# Patient Record
Sex: MALE | Race: WHITE | NOT HISPANIC OR LATINO | Employment: OTHER | URBAN - METROPOLITAN AREA
[De-identification: names, ages, dates, MRNs, and addresses within clinical notes are randomized per-mention and may not be internally consistent; named-entity substitution may affect disease eponyms.]

---

## 2019-08-26 ENCOUNTER — OFFICE VISIT (OUTPATIENT)
Dept: FAMILY MEDICINE | Facility: CLINIC | Age: 44
End: 2019-08-26
Payer: COMMERCIAL

## 2019-08-26 VITALS
HEART RATE: 78 BPM | HEIGHT: 68 IN | WEIGHT: 193 LBS | BODY MASS INDEX: 29.25 KG/M2 | OXYGEN SATURATION: 98 % | TEMPERATURE: 98 F | SYSTOLIC BLOOD PRESSURE: 122 MMHG | DIASTOLIC BLOOD PRESSURE: 88 MMHG | RESPIRATION RATE: 18 BRPM

## 2019-08-26 DIAGNOSIS — R25.3 FASCICULATIONS: Primary | ICD-10-CM

## 2019-08-26 DIAGNOSIS — D22.9 CHANGE IN MOLE: ICD-10-CM

## 2019-08-26 PROCEDURE — 99214 OFFICE O/P EST MOD 30 MIN: CPT | Performed by: FAMILY MEDICINE

## 2019-08-26 ASSESSMENT — MIFFLIN-ST. JEOR: SCORE: 1754.81

## 2019-08-26 ASSESSMENT — PAIN SCALES - GENERAL: PAINLEVEL: NO PAIN (0)

## 2019-08-26 NOTE — PROGRESS NOTES
Subjective     Navin Velasquez is a 44 year old male who presents to clinic today for the following health issues:    Patient is here today for nerve pain which started a few months ago but within the last few weeks it has gotten worse. He states its not really a pain but more like tremors or something. He states it happens in his legs but he has not noticed it this week. He is also here today for a mole consult he states it has gotten bigger and had some discharge about a month ago it is located on his nose.     Fasciculations:  Buzzing/vibration sensation.  Leg tremors  Also felt femorous   With time felt like was in his stoach and one tme the chest ?   Started about 4 months; was worst in last couple of weeks    Waking up at night:   - usually 2-3 AM    - Feels like he is hooked to a tens unit    Headaches (long hx) has history concussions (suspected)  No alcohol uses  No medication  Diet: Was on road a lot, was eating snacks, cut down weight, not eating much vegetables.    Patient Active Problem List   Diagnosis     CARDIOVASCULAR SCREENING; LDL GOAL LESS THAN 160     Knee injury     Past Surgical History:   Procedure Laterality Date     ORTHOPEDIC SURGERY  8/2012    emergency right knee scope       Social History     Tobacco Use     Smoking status: Former Smoker     Smokeless tobacco: Former User   Substance Use Topics     Alcohol use: No     Family History   Problem Relation Age of Onset     Hypertension Mother      Vitiligo Mother      Cardiovascular Maternal Grandfather 34        mi     Cardiovascular Maternal Uncle 34     Hypertension Maternal Uncle      Esophageal Cancer Maternal Uncle      Hypertension Maternal Aunt      Prostate Cancer Paternal Uncle            Reviewed and updated as needed this visit by Provider    Review of Systems   Constitutional, HEENT, cardiovascular, pulmonary, gi and gu systems are negative, except as otherwise noted.      Objective    /88 (BP Location: Left arm, Cuff  "Size: Adult Regular)   Pulse 78   Temp 98  F (36.7  C) (Oral)   Resp 18   Ht 1.735 m (5' 8.31\")   Wt 87.5 kg (193 lb)   SpO2 98%   BMI 29.08 kg/m    Body mass index is 29.08 kg/m .  Physical Exam   GENERAL: anxious looking, alert and no distress  RESP: lungs clear to auscultation - no rales, rhonchi or wheezes  CV: regular rate and rhythm, normal S1 S2, no S3 or S4, no murmur, click or rub, no peripheral edema   MS: no gross musculoskeletal defects noted, no edema  NEURO: Normal strength and tone, mentation intact and speech normal, no obvious tremors or fasciculations.  PSYCH: mentation appears normal, affect normal/bright    Diagnostic Test Results:    Results for orders placed or performed in visit on 01/28/13   CBC with platelets   Result Value Ref Range    WBC 7.7 4.0 - 11.0 10e9/L    RBC Count 5.02 4.4 - 5.9 10e12/L    Hemoglobin 14.6 13.3 - 17.7 g/dL    Hematocrit 44.1 40.0 - 53.0 %    MCV 88 78 - 100 fl    MCH 29.2 26.5 - 33.0 pg    MCHC 33.2 31.5 - 36.5 g/dL    RDW 12.9 10.0 - 15.0 %    Platelet Count 148 (L) 150 - 450 10e9/L   Amylase   Result Value Ref Range    Amylase 98 30 - 110 U/L   Lipase   Result Value Ref Range    Lipase 59 20 - 250 U/L   Hepatic panel   Result Value Ref Range    Bilirubin Conjugated 0.0 0.0 - 0.3 mg/dL    Bilirubin Delta 0.4 0.0 - 0.4 mg/dL    Bilirubin Total 1.6 (H) 0.2 - 1.3 mg/dL    Albumin 4.7 3.9 - 5.1 g/dL    Protein Total 7.6 6.8 - 8.8 g/dL    Alkaline Phosphatase 70 40 - 150 U/L    ALT 76 (H) 0 - 70 U/L    AST 40 0 - 45 U/L       Assessment & Plan     Navin was seen today for nerve pain, mole consult and health maintenance.    Diagnoses and all orders for this visit:    Fasciculations  -     Cancel: Magnesium  -     Cancel: CBC with platelets differential  -     Cancel: Vitamin B12  -     CBC with platelets differential; Future  -     Comprehensive metabolic panel (BMP + Alb, Alk Phos, ALT, AST, Total. Bili, TP); Future  -     Lipid panel reflex to direct LDL " "Fasting; Future  -     Magnesium; Future  -     Vitamin B12; Future    Change in mole  -     DERMATOLOGY REFERRAL    BMI:   Estimated body mass index is 29.08 kg/m  as calculated from the following:    Height as of this encounter: 1.735 m (5' 8.31\").    Weight as of this encounter: 87.5 kg (193 lb).   Weight management plan: Discussed healthy diet and exercise guidelines    Return in about 1 week (around 9/2/2019).    Maxwell Weaver MD  [facility]    "

## 2019-08-29 DIAGNOSIS — R25.3 FASCICULATIONS: ICD-10-CM

## 2019-08-29 LAB
ALBUMIN SERPL-MCNC: 4.2 G/DL (ref 3.4–5)
ALP SERPL-CCNC: 62 U/L (ref 40–150)
ALT SERPL W P-5'-P-CCNC: 52 U/L (ref 0–70)
ANION GAP SERPL CALCULATED.3IONS-SCNC: 6 MMOL/L (ref 3–14)
AST SERPL W P-5'-P-CCNC: 24 U/L (ref 0–45)
BASOPHILS # BLD AUTO: 0 10E9/L (ref 0–0.2)
BASOPHILS NFR BLD AUTO: 0.2 %
BILIRUB SERPL-MCNC: 1.7 MG/DL (ref 0.2–1.3)
BUN SERPL-MCNC: 12 MG/DL (ref 7–30)
CALCIUM SERPL-MCNC: 9.1 MG/DL (ref 8.5–10.1)
CHLORIDE SERPL-SCNC: 109 MMOL/L (ref 94–109)
CHOLEST SERPL-MCNC: 153 MG/DL
CO2 SERPL-SCNC: 27 MMOL/L (ref 20–32)
CREAT SERPL-MCNC: 0.92 MG/DL (ref 0.66–1.25)
DIFFERENTIAL METHOD BLD: ABNORMAL
EOSINOPHIL # BLD AUTO: 0.1 10E9/L (ref 0–0.7)
EOSINOPHIL NFR BLD AUTO: 1.1 %
ERYTHROCYTE [DISTWIDTH] IN BLOOD BY AUTOMATED COUNT: 13.5 % (ref 10–15)
GFR SERPL CREATININE-BSD FRML MDRD: >90 ML/MIN/{1.73_M2}
GLUCOSE SERPL-MCNC: 88 MG/DL (ref 70–99)
HCT VFR BLD AUTO: 44.7 % (ref 40–53)
HDLC SERPL-MCNC: 47 MG/DL
HGB BLD-MCNC: 15.6 G/DL (ref 13.3–17.7)
LDLC SERPL CALC-MCNC: 93 MG/DL
LYMPHOCYTES # BLD AUTO: 2 10E9/L (ref 0.8–5.3)
LYMPHOCYTES NFR BLD AUTO: 35.1 %
MAGNESIUM SERPL-MCNC: 2.4 MG/DL (ref 1.6–2.3)
MCH RBC QN AUTO: 28.7 PG (ref 26.5–33)
MCHC RBC AUTO-ENTMCNC: 34.9 G/DL (ref 31.5–36.5)
MCV RBC AUTO: 82 FL (ref 78–100)
MONOCYTES # BLD AUTO: 0.5 10E9/L (ref 0–1.3)
MONOCYTES NFR BLD AUTO: 7.9 %
NEUTROPHILS # BLD AUTO: 3.2 10E9/L (ref 1.6–8.3)
NEUTROPHILS NFR BLD AUTO: 55.7 %
NONHDLC SERPL-MCNC: 106 MG/DL
PLATELET # BLD AUTO: 141 10E9/L (ref 150–450)
POTASSIUM SERPL-SCNC: 4.2 MMOL/L (ref 3.4–5.3)
PROT SERPL-MCNC: 7 G/DL (ref 6.8–8.8)
RBC # BLD AUTO: 5.43 10E12/L (ref 4.4–5.9)
SODIUM SERPL-SCNC: 142 MMOL/L (ref 133–144)
TRIGL SERPL-MCNC: 66 MG/DL
VIT B12 SERPL-MCNC: 1005 PG/ML (ref 193–986)
WBC # BLD AUTO: 5.7 10E9/L (ref 4–11)

## 2019-08-29 PROCEDURE — 80061 LIPID PANEL: CPT | Performed by: FAMILY MEDICINE

## 2019-08-29 PROCEDURE — 85025 COMPLETE CBC W/AUTO DIFF WBC: CPT | Performed by: FAMILY MEDICINE

## 2019-08-29 PROCEDURE — 36415 COLL VENOUS BLD VENIPUNCTURE: CPT | Performed by: FAMILY MEDICINE

## 2019-08-29 PROCEDURE — 80053 COMPREHEN METABOLIC PANEL: CPT | Performed by: FAMILY MEDICINE

## 2019-08-29 PROCEDURE — 82607 VITAMIN B-12: CPT | Performed by: FAMILY MEDICINE

## 2019-08-29 PROCEDURE — 83735 ASSAY OF MAGNESIUM: CPT | Performed by: FAMILY MEDICINE

## 2019-09-03 NOTE — PROGRESS NOTES
Chester Velasquez is a 44 year old male who presents to clinic today for the following health issues:    HPI   Chief Complaint   Patient presents with     Numbness     buzzing in lower left leg f/u     Still has the bilateral buzzing sensation both legs x for a long time but ghad kyler worse in last few months.   Worse when relaxing. Associated with tingling in the knee Lt  Was eating lots of Maced[place] nuts and found out can cause muscle issues in dogs and wonders if experiencing same. However symptoms are getting better    Patient Active Problem List   Diagnosis     CARDIOVASCULAR SCREENING; LDL GOAL LESS THAN 160     Knee injury     Past Surgical History:   Procedure Laterality Date     ORTHOPEDIC SURGERY  8/2012    emergency right knee scope       Social History     Tobacco Use     Smoking status: Former Smoker     Smokeless tobacco: Former User   Substance Use Topics     Alcohol use: No     Family History   Problem Relation Age of Onset     Hypertension Mother      Vitiligo Mother      Cardiovascular Maternal Grandfather 34        mi     Cardiovascular Maternal Uncle 34     Hypertension Maternal Uncle      Esophageal Cancer Maternal Uncle      Hypertension Maternal Aunt      Prostate Cancer Paternal Uncle          PROBLEMS TO ADD ON...  Reviewed and updated as needed this visit by Provider    Review of Systems    HEENT, cardiovascular, pulmonary, gi and gu systems are negative, except as otherwise noted.      Objective    /78   Pulse 56   Temp 97.5  F (36.4  C) (Oral)   Wt 88.5 kg (195 lb)   SpO2 96%   BMI 29.38 kg/m    Body mass index is 29.38 kg/m .  Physical Exam   GENERAL: healthy, alert and no distress  NECK: no adenopathy and thyroid normal to palpation  RESP: lungs clear to auscultation - no rales, rhonchi or wheezes  CV: regular rate and rhythm, normal S1 S2, no S3 or S4, no murmur, click or rub  MS: no gross musculoskeletal defects noted, no edema  NEURO: Normal strength and tone,  DTR normal, mentation intact and speech normal    Diagnostic Test Results:  Labs reviewed in Epic        Assessment & Plan     Navin was seen today for numbness.    Diagnoses and all orders for this visit:    Abnormal sensation of lower extremity    Non specific symptoms; could be consequence of vibrations from daily driving. He wants to get to root cause; will have evaluation by neurology  -     NEUROLOGY ADULT REFERRAL    Return in about 1 year (around 9/4/2020) for Physical Exam.    Maxwell Weaver MD  [facility]

## 2019-09-04 ENCOUNTER — OFFICE VISIT (OUTPATIENT)
Dept: FAMILY MEDICINE | Facility: CLINIC | Age: 44
End: 2019-09-04
Payer: COMMERCIAL

## 2019-09-04 VITALS
BODY MASS INDEX: 29.38 KG/M2 | HEART RATE: 56 BPM | SYSTOLIC BLOOD PRESSURE: 122 MMHG | OXYGEN SATURATION: 96 % | TEMPERATURE: 97.5 F | WEIGHT: 195 LBS | DIASTOLIC BLOOD PRESSURE: 78 MMHG

## 2019-09-04 DIAGNOSIS — R20.9 ABNORMAL SENSATION OF LOWER EXTREMITY: Primary | ICD-10-CM

## 2019-09-04 PROCEDURE — 99213 OFFICE O/P EST LOW 20 MIN: CPT | Performed by: FAMILY MEDICINE

## 2019-10-30 ENCOUNTER — OFFICE VISIT (OUTPATIENT)
Dept: NEUROLOGY | Facility: CLINIC | Age: 44
End: 2019-10-30
Attending: FAMILY MEDICINE
Payer: COMMERCIAL

## 2019-10-30 VITALS
HEIGHT: 69 IN | WEIGHT: 200.8 LBS | BODY MASS INDEX: 29.74 KG/M2 | SYSTOLIC BLOOD PRESSURE: 143 MMHG | HEART RATE: 71 BPM | DIASTOLIC BLOOD PRESSURE: 76 MMHG | OXYGEN SATURATION: 96 %

## 2019-10-30 DIAGNOSIS — R20.9 SENSORY DISTURBANCE: ICD-10-CM

## 2019-10-30 DIAGNOSIS — H21.562 AFFERENT PUPILLARY DEFECT OF LEFT EYE: ICD-10-CM

## 2019-10-30 DIAGNOSIS — R29.818 LHERMITTE'S SIGN POSITIVE: ICD-10-CM

## 2019-10-30 PROCEDURE — 99203 OFFICE O/P NEW LOW 30 MIN: CPT | Performed by: PSYCHIATRY & NEUROLOGY

## 2019-10-30 RX ORDER — SENNOSIDES 8.6 MG
1300 CAPSULE ORAL EVERY 8 HOURS PRN
COMMUNITY

## 2019-10-30 ASSESSMENT — MIFFLIN-ST. JEOR: SCORE: 1801.2

## 2019-10-30 ASSESSMENT — PAIN SCALES - GENERAL: PAINLEVEL: NO PAIN (0)

## 2019-10-30 NOTE — LETTER
10/30/2019       RE: Navin Velasquez  [address] [place] [address]     Dear Colleague,    Thank you for referring your patient, Navin Velasquez, to the [facility] at [facility]. Please see a copy of my visit note below.    Visit Date:   10/30/2019      2019         Darshan Carrasquillo MD   [address] [place] [address]  [id number]      Re:   Navin Velasquez    1975   [facility]  [id number]      Dear Dr. Carrasquillo:      I saw your patient, Navin Velasquez for neurologic evaluation on 10/30/2019.  He is a 44-year-old male here for evaluation of lower extremity sensory symptoms.      He does local .  He has found for some time at the end of the day he would have what he characterizes as muscle spasms in the legs.      About 7 or 8 months ago he began experiencing a different sensation.  It is a pulsating sense in his legs that is not visible.  It is present in any position, but bothers him most when he is in bed.  He describes it as the internal aspect of the left leg extending down to the foot and the lateral aspect of the right calf.  Occasionally, he has had the same shaky sensation in his abdomen.  He was eating macadamia nuts daily and he stopped those and this shaky feeling seemed to improve.      He has not appreciated any weakness or loss of sensation.  He does report some intermittent urinary urgency.      He does report a history of herniated disk in his back and he would get some intermittent foot and left leg numbness with ambulation that he feels related to this.  These symptoms resolved.  He did not have surgery.      He denies any upper extremity symptoms.      He initially denied any visual symptoms, but when questioned further, he does indicate in  he developed pain and blurring of vision in the left eye.  He had been using some spray paint that day.  However, the  visual blurring persisted for several days.  He recalls seeing an eye doctor at [facility] and, by his description, they found floaters but no one mentioned optic neuritis.      His wife is a nurse practitioner.  She discussed his current symptoms with colleagues and suggested he flex his neck and see if it would produce his symptoms in his legs and it did.  He does have some intermittent neck pain but nothing persistent.      Recent laboratory work included a CBC which was essentially normal aside from a platelet count of 141,000.  Comprehensive metabolic panel was essentially normal as well.  B12 level 1005.      His past history is otherwise unremarkable.  He does not take any prescription medications, but will use Tylenol or ibuprofen for pain.      ALLERGIES:  He has no medical allergies.      FAMILY HISTORY:  Notable for paternal grandmother with Alzheimer's disease, but otherwise is negative for neurologic disease as best he knows.      SOCIAL HISTORY:  He is a .  He does not smoke or use alcohol.      PHYSICAL EXAMINATION:   GENERAL:  Reveals the patient is alert and cooperative.   VITAL SIGNS:  Heart rate 71.  Blood pressure 143/76.   CRANIAL NERVES:  The left pupil is slightly larger than the right, and he appears to have a subtle afferent pupillary defect on the left.  I do not appreciate any optic atrophy, however.  He can get down to 20/20 vision in both eyes with reading glasses.  Extraocular motility is full with some end-gaze horizontal nystagmus.  Otherwise, aside from a slight flattening of the left nasolabial fold, cranial nerves II-XII are intact.   MOTOR:  Reveals excellent upper and lower extremity strength.   SENSORY:  Examination is intact to position, vibration and pinprick.  There is no sensory loss over the trunk.   GAIT, STATION AND COORDINATION:  Finger-to-nose and heel-to-shin are done well.  His muscle tone is normal.   REFLEXES:  2+ in upper  extremities, 2+ at the knees and 2-3+ at the ankles.  Plantar responses are flexor.      IMPRESSION:   1. Sensory disturbance.   2. Patient report of positive Lhermitte sign.   3. Afferent pupillary defect, left eye.      PLAN:  I did explain to the patient that his examination is largely unrevealing aside from the eye findings.  The symptoms he develops when he flexes his neck suggests a possibility of a cervical spinal cord lesion.  The possible afferent pupillary defect on the left suggests the possibility of multifocal neurologic process such as demyelinating disease.      For further evaluation, he is going to have head, cervical and thoracic MRI scans done.  I will communicate these results to him when available.  Additional testing may prove necessary depending on the studies.      Sincerely,            GIANFRANCO HUFF MD             D: 10/30/2019   T: 10/30/2019   MT: SHERRY      Name:     LALITO PAULINO   MRN:      [id number]        Account:      [id number]   :      1975           Visit Date:   10/30/2019      Document: [id number]       cc: Darshan Carrasquillo MD       Again, thank you for allowing me to participate in the care of your patient.      Sincerely,    Gianfranco Huff MD

## 2019-10-30 NOTE — LETTER
10/30/2019         RE: Navin Velasquez  [address] [place] [address]        Dear Colleague,    Thank you for referring your patient, Navin Velasquez, to the [facility]. Please see a copy of my visit note below.    Visit Date:   10/30/2019      2019         Darshan Carrasquillo MD   [facility]   [address]   [place]  [address]      Re:   Navin Velasquez    1975   [facility]  [id number]      Dear Dr. Carrasquillo:      I saw your patient, Navin Velasquez for neurologic evaluation on 10/30/2019.  He is a 44-year-old male here for evaluation of lower extremity sensory symptoms.      He does local .  He has found for some time at the end of the day he would have what he characterizes as muscle spasms in the legs.      About 7 or 8 months ago he began experiencing a different sensation.  It is a pulsating sense in his legs that is not visible.  It is present in any position, but bothers him most when he is in bed.  He describes it as the internal aspect of the left leg extending down to the foot and the lateral aspect of the right calf.  Occasionally, he has had the same shaky sensation in his abdomen.  He was eating macadamia nuts daily and he stopped those and this shaky feeling seemed to improve.      He has not appreciated any weakness or loss of sensation.  He does report some intermittent urinary urgency.      He does report a history of herniated disk in his back and he would get some intermittent foot and left leg numbness with ambulation that he feels related to this.  These symptoms resolved.  He did not have surgery.      He denies any upper extremity symptoms.      He initially denied any visual symptoms, but when questioned further, he does indicate in  he developed pain and blurring of vision in the left eye.  He had been using some spray paint that day.  However, the visual blurring persisted for several days.  He  recalls seeing an eye doctor at [facility] and, by his description, they found floaters but no one mentioned optic neuritis.      His wife is a nurse practitioner.  She discussed his current symptoms with colleagues and suggested he flex his neck and see if it would produce his symptoms in his legs and it did.  He does have some intermittent neck pain but nothing persistent.      Recent laboratory work included a CBC which was essentially normal aside from a platelet count of 141,000.  Comprehensive metabolic panel was essentially normal as well.  B12 level 1005.      His past history is otherwise unremarkable.  He does not take any prescription medications, but will use Tylenol or ibuprofen for pain.      ALLERGIES:  He has no medical allergies.      FAMILY HISTORY:  Notable for paternal grandmother with Alzheimer's disease, but otherwise is negative for neurologic disease as best he knows.      SOCIAL HISTORY:  He is a .  He does not smoke or use alcohol.      PHYSICAL EXAMINATION:   GENERAL:  Reveals the patient is alert and cooperative.   VITAL SIGNS:  Heart rate 71.  Blood pressure 143/76.   CRANIAL NERVES:  The left pupil is slightly larger than the right, and he appears to have a subtle afferent pupillary defect on the left.  I do not appreciate any optic atrophy, however.  He can get down to 20/20 vision in both eyes with reading glasses.  Extraocular motility is full with some end-gaze horizontal nystagmus.  Otherwise, aside from a slight flattening of the left nasolabial fold, cranial nerves II-XII are intact.   MOTOR:  Reveals excellent upper and lower extremity strength.   SENSORY:  Examination is intact to position, vibration and pinprick.  There is no sensory loss over the trunk.   GAIT, STATION AND COORDINATION:  Finger-to-nose and heel-to-shin are done well.  His muscle tone is normal.   REFLEXES:  2+ in upper extremities, 2+ at the knees and 2-3+ at the ankles.   Plantar responses are flexor.      IMPRESSION:   1. Sensory disturbance.   2. Patient report of positive Lhermitte sign.   3. Afferent pupillary defect, left eye.      PLAN:  I did explain to the patient that his examination is largely unrevealing aside from the eye findings.  The symptoms he develops when he flexes his neck suggests a possibility of a cervical spinal cord lesion.  The possible afferent pupillary defect on the left suggests the possibility of multifocal neurologic process such as demyelinating disease.      For further evaluation, he is going to have head, cervical and thoracic MRI scans done.  I will communicate these results to him when available.  Additional testing may prove necessary depending on the studies.      Sincerely,            GIANFRANCO HUFF MD             D: 10/30/2019   T: 10/30/2019   MT: SHERRY      Name:     LALITO PAULINO   MRN:      [id number]        Account:      [id number]   :      1975           Visit Date:   10/30/2019      Document: [id number]       cc: Darshan Carrasquillo MD       Again, thank you for allowing me to participate in the care of your patient.        Sincerely,        Gianfranco Huff MD

## 2019-10-30 NOTE — NURSING NOTE
"Navin Ron's goals for this visit include: return  He requests these members of his care team be copied on today's visit information:     PCP: José Manuel Kulkarni    Referring Provider:  Maxwell Weaver MD  [address]  [place] [address]    BP (!) 143/76   Pulse 71   Ht 1.753 m (5' 9\")   Wt 91.1 kg (200 lb 12.8 oz)   SpO2 96%   BMI 29.65 kg/m      Do you need any medication refills at today's visit? n  "

## 2019-10-31 NOTE — PROGRESS NOTES
Visit Date:   10/30/2019      2019         Darshan Carrasquillo MD   [facility]   [address]   [place]  [address]      Re:   Navin Velasquez    1975   MRN  [id number]      Dear Dr. Carrasquillo:      I saw your patient, Navin Velasquez for neurologic evaluation on 10/30/2019.  He is a 44-year-old male here for evaluation of lower extremity sensory symptoms.      He does local .  He has found for some time at the end of the day he would have what he characterizes as muscle spasms in the legs.      About 7 or 8 months ago he began experiencing a different sensation.  It is a pulsating sense in his legs that is not visible.  It is present in any position, but bothers him most when he is in bed.  He describes it as the internal aspect of the left leg extending down to the foot and the lateral aspect of the right calf.  Occasionally, he has had the same shaky sensation in his abdomen.  He was eating macadamia nuts daily and he stopped those and this shaky feeling seemed to improve.      He has not appreciated any weakness or loss of sensation.  He does report some intermittent urinary urgency.      He does report a history of herniated disk in his back and he would get some intermittent foot and left leg numbness with ambulation that he feels related to this.  These symptoms resolved.  He did not have surgery.      He denies any upper extremity symptoms.      He initially denied any visual symptoms, but when questioned further, he does indicate in  he developed pain and blurring of vision in the left eye.  He had been using some spray paint that day.  However, the visual blurring persisted for several days.  He recalls seeing an eye doctor at [facility] and, by his description, they found floaters but no one mentioned optic neuritis.      His wife is a nurse practitioner.  She discussed his current symptoms with colleagues and suggested  he flex his neck and see if it would produce his symptoms in his legs and it did.  He does have some intermittent neck pain but nothing persistent.      Recent laboratory work included a CBC which was essentially normal aside from a platelet count of 141,000.  Comprehensive metabolic panel was essentially normal as well.  B12 level 1005.      His past history is otherwise unremarkable.  He does not take any prescription medications, but will use Tylenol or ibuprofen for pain.      ALLERGIES:  He has no medical allergies.      FAMILY HISTORY:  Notable for paternal grandmother with Alzheimer's disease, but otherwise is negative for neurologic disease as best he knows.      SOCIAL HISTORY:  He is a .  He does not smoke or use alcohol.      PHYSICAL EXAMINATION:   GENERAL:  Reveals the patient is alert and cooperative.   VITAL SIGNS:  Heart rate 71.  Blood pressure 143/76.   CRANIAL NERVES:  The left pupil is slightly larger than the right, and he appears to have a subtle afferent pupillary defect on the left.  I do not appreciate any optic atrophy, however.  He can get down to 20/20 vision in both eyes with reading glasses.  Extraocular motility is full with some end-gaze horizontal nystagmus.  Otherwise, aside from a slight flattening of the left nasolabial fold, cranial nerves II-XII are intact.   MOTOR:  Reveals excellent upper and lower extremity strength.   SENSORY:  Examination is intact to position, vibration and pinprick.  There is no sensory loss over the trunk.   GAIT, STATION AND COORDINATION:  Finger-to-nose and heel-to-shin are done well.  His muscle tone is normal.   REFLEXES:  2+ in upper extremities, 2+ at the knees and 2-3+ at the ankles.  Plantar responses are flexor.      IMPRESSION:   1. Sensory disturbance.   2. Patient report of positive Lhermitte sign.   3. Afferent pupillary defect, left eye.      PLAN:  I did explain to the patient that his examination is largely unrevealing aside  from the eye findings.  The symptoms he develops when he flexes his neck suggests a possibility of a cervical spinal cord lesion.  The possible afferent pupillary defect on the left suggests the possibility of multifocal neurologic process such as demyelinating disease.      For further evaluation, he is going to have head, cervical and thoracic MRI scans done.  I will communicate these results to him when available.  Additional testing may prove necessary depending on the studies.     ADDENDUM 19: Head MRI normal. Cervical and thoracic MRIs unremarkable. Discussed with patient. He reports some numbness left leg without pain. He also voiced concern that his wife has an implanted electrical stimulator causing his symptoms in bed as also experienced when she used a TENS unit. I told him this seems quite improbable. Asked him to follow up with me.     Sincerely,            GELA HUFF MD             D: 10/30/2019   T: 10/30/2019   MT: SHERRY      Name:     LALITO PAULINO   MRN:      [id number]        Account:      [id number]   :      1975           Visit Date:   10/30/2019      Document: [id number]       cc: Darshan Carrasquillo MD

## 2019-11-05 ENCOUNTER — HEALTH MAINTENANCE LETTER (OUTPATIENT)
Age: 44
End: 2019-11-05

## 2019-11-13 ENCOUNTER — ANCILLARY PROCEDURE (OUTPATIENT)
Dept: MRI IMAGING | Facility: CLINIC | Age: 44
End: 2019-11-13
Attending: PSYCHIATRY & NEUROLOGY
Payer: COMMERCIAL

## 2019-11-13 ENCOUNTER — ANCILLARY PROCEDURE (OUTPATIENT)
Dept: GENERAL RADIOLOGY | Facility: CLINIC | Age: 44
End: 2019-11-13
Attending: PSYCHIATRY & NEUROLOGY
Payer: COMMERCIAL

## 2019-11-13 DIAGNOSIS — R20.9 SENSORY DISTURBANCE: ICD-10-CM

## 2019-11-13 DIAGNOSIS — H21.562 AFFERENT PUPILLARY DEFECT OF LEFT EYE: ICD-10-CM

## 2019-11-13 DIAGNOSIS — R29.818 LHERMITTE'S SIGN POSITIVE: ICD-10-CM

## 2019-11-13 PROCEDURE — 70200 X-RAY EXAM OF EYE SOCKETS: CPT | Performed by: RADIOLOGY

## 2019-11-13 PROCEDURE — 72157 MRI CHEST SPINE W/O & W/DYE: CPT | Performed by: RADIOLOGY

## 2019-11-13 PROCEDURE — 70553 MRI BRAIN STEM W/O & W/DYE: CPT | Performed by: RADIOLOGY

## 2019-11-13 PROCEDURE — A9585 GADOBUTROL INJECTION: HCPCS | Performed by: PSYCHIATRY & NEUROLOGY

## 2019-11-13 PROCEDURE — 72156 MRI NECK SPINE W/O & W/DYE: CPT | Performed by: RADIOLOGY

## 2019-11-13 RX ORDER — GADOBUTROL 604.72 MG/ML
10 INJECTION INTRAVENOUS ONCE
Status: COMPLETED | OUTPATIENT
Start: 2019-11-13 | End: 2019-11-13

## 2019-11-13 RX ADMIN — GADOBUTROL 10 ML: 604.72 INJECTION INTRAVENOUS at 15:46

## 2019-12-11 ENCOUNTER — OFFICE VISIT (OUTPATIENT)
Dept: DERMATOLOGY | Facility: CLINIC | Age: 44
End: 2019-12-11
Attending: FAMILY MEDICINE
Payer: COMMERCIAL

## 2019-12-11 DIAGNOSIS — D22.9 MULTIPLE BENIGN NEVI: ICD-10-CM

## 2019-12-11 DIAGNOSIS — L91.8 SKIN TAG: ICD-10-CM

## 2019-12-11 DIAGNOSIS — L81.4 SOLAR LENTIGO: ICD-10-CM

## 2019-12-11 DIAGNOSIS — D18.01 CHERRY ANGIOMA: ICD-10-CM

## 2019-12-11 DIAGNOSIS — L57.8 SUN-DAMAGED SKIN: Primary | ICD-10-CM

## 2019-12-11 PROCEDURE — 99203 OFFICE O/P NEW LOW 30 MIN: CPT | Performed by: DERMATOLOGY

## 2019-12-11 ASSESSMENT — PAIN SCALES - GENERAL: PAINLEVEL: NO PAIN (0)

## 2019-12-11 NOTE — NURSING NOTE
Navin ALESHA Velasquez's goals for this visit include:   Chief Complaint   Patient presents with     Skin Check     Navin is visiting for a skin check with one area of concern on the nose- Family hx of skin cancer     He requests these members of his care team be copied on today's visit information:     PCP: Darshan Carrasquillo    Referring Provider:  Maxwell Weaver MD  [address] [place] [address]    There were no vitals taken for this visit.    Do you need any medication refills at today's visit? No    Skye Mares LPN

## 2019-12-11 NOTE — PROGRESS NOTES
[facility] Dermatology Note    Dermatology Problem List:  1. None    Encounter Date: Dec 11, 2019    CC:  Chief Complaint   Patient presents with     Skin Check     Navin is visiting for a skin check with one area of concern on the nose- Family hx of skin cancer     History of Present Illness:  Mr. Navin Velasquez is a 44 year old male who presents as a referral from Maxwell Weaver MD for a skin check. Navin notes that his mom has had skin cancer, but he does not know what type. No personal history of skin cancer. He is concerned about a spot on his nose today. He notes that he has always had a mole there (so do his dad and his son), but at least once, it got a pimple like growth within it and drained white material. This has not happened again to his knowledge. The mole does not bother him in cosmetic appearance, he just wants to make sure it is not cancerous. No other concerns addressed today.    Past Medical History:   Patient Active Problem List   Diagnosis     CARDIOVASCULAR SCREENING; LDL GOAL LESS THAN 160     Knee injury     No past medical history on file.  Past Surgical History:   Procedure Laterality Date     ORTHOPEDIC SURGERY  8/2012    emergency right knee scope       Social History:  Patient reports that he has quit smoking. He has quit using smokeless tobacco. He reports that he does not drink alcohol or use drugs. Works as a .     Family History:  History of vitiligo in mother.  History of unknown type of skin cancer in mom.     Medications:  Current Outpatient Medications   Medication Sig Dispense Refill     acetaminophen (TYLENOL 8 HOUR ARTHRITIS PAIN) 650 MG CR tablet Take 1,300 mg by mouth every 8 hours as needed for mild pain or fever       ibuprofen (ADVIL) 200 MG capsule Take 400 mg by mouth every 4 hours as needed          No Known Allergies    Review of Systems:  -Constitutional: Patient is otherwise feeling well, in usual state of health.    -Skin: As above in HPI. No additional skin concerns.    Physical exam:  Vitals: There were no vitals taken for this visit.  GEN: This is a well developed, well-nourished male in no acute distress, in a pleasant mood.    SKIN: Total skin excluding the undergarment areas was performed. The exam included the head/face, neck, both arms, chest, back, abdomen, both legs, digits and/or nails and buttocks.   - Hernandez Type IV  - Scattered brown macules on sun exposed areas. Very few. Mostly on the face/neck.   - There is(are) skin colored pedunculated papules on the neck.   - There are dome shaped bright red papules on the trunk.   - Multiple regular brown pigmented macules and papules are identified on the trunk and face; a compound nevus at the nasal tip without atypia.   - There is a firm tan/flesh colored papule that dimples with lateral pressure on the left shin.  - No other lesions of concern on areas examined.    Impression/Plan:    1. Sun damaged skin with solar lentigines  - Recommend sunscreens SPF #30 or greater, protective clothing and avoidance of tanning beds.  - Discussed good sun protective practices for patient's occupation    2. Benign findings including: cherry angioma, dermatofibroma, skin tags  - No further intervention required. Patient to report changes.   - Patient reassured of the benign nature of these lesions.    3. Multiple clinically benign nevi  - No further intervention required. Patient to report changes.   - Patient reassured of the benign nature of these lesions.    CC Maxwell Weaver MD on close of this encounter.  Follow-up in 2-3 years  for Skin Check, earlier for new or changing lesions.     Staff Involved:  Scribe/Staff    Scribe Disclosure  Skye Mares LPN    Provider Disclosure:   The documentation recorded by the scribe accurately reflects the services I personally performed and the decisions made by me.    Lulú Lechuga MD    Department of  Dermatology  [facility]: Phone: [phone], Fax:[phone]  [facility]: Phone: [phone], Fax: [phone]

## 2019-12-11 NOTE — LETTER
12/11/2019         RE: Navin Velasquez  [address] [place] [address]        Dear Colleague,    Thank you for referring your patient, Navin Velasquez, to the [facility]. Please see a copy of my visit note below.    [facility] Dermatology Note    Dermatology Problem List:  1. None    Encounter Date: Dec 11, 2019    CC:  Chief Complaint   Patient presents with     Skin Check     Navin is visiting for a skin check with one area of concern on the nose- Family hx of skin cancer     History of Present Illness:  Mr. Navin Velasquez is a 44 year old male who presents as a referral from Maxwell Weaver MD for a skin check. Navin notes that his mom has had skin cancer, but he does not know what type. No personal history of skin cancer. He is concerned about a spot on his nose today. He notes that he has always had a mole there (so do his dad and his son), but at least once, it got a pimple like growth within it and drained white material. This has not happened again to his knowledge. The mole does not bother him in cosmetic appearance, he just wants to make sure it is not cancerous. No other concerns addressed today.    Past Medical History:   Patient Active Problem List   Diagnosis     CARDIOVASCULAR SCREENING; LDL GOAL LESS THAN 160     Knee injury     No past medical history on file.  Past Surgical History:   Procedure Laterality Date     ORTHOPEDIC SURGERY  8/2012    emergency right knee scope       Social History:  Patient reports that he has quit smoking. He has quit using smokeless tobacco. He reports that he does not drink alcohol or use drugs. Works as a .     Family History:  History of vitiligo in mother.  History of unknown type of skin cancer in mom.     Medications:  Current Outpatient Medications   Medication Sig Dispense Refill     acetaminophen (TYLENOL 8 HOUR ARTHRITIS PAIN) 650 MG CR tablet Take 1,300 mg by mouth every 8  hours as needed for mild pain or fever       ibuprofen (ADVIL) 200 MG capsule Take 400 mg by mouth every 4 hours as needed          No Known Allergies    Review of Systems:  -Constitutional: Patient is otherwise feeling well, in usual state of health.   -Skin: As above in HPI. No additional skin concerns.    Physical exam:  Vitals: There were no vitals taken for this visit.  GEN: This is a well developed, well-nourished male in no acute distress, in a pleasant mood.    SKIN: Total skin excluding the undergarment areas was performed. The exam included the head/face, neck, both arms, chest, back, abdomen, both legs, digits and/or nails and buttocks.   - Hernandez Type IV  - Scattered brown macules on sun exposed areas. Very few. Mostly on the face/neck.   - There is(are) skin colored pedunculated papules on the neck.   - There are dome shaped bright red papules on the trunk.   - Multiple regular brown pigmented macules and papules are identified on the trunk and face; a compound nevus at the nasal tip without atypia.   - There is a firm tan/flesh colored papule that dimples with lateral pressure on the left shin.  - No other lesions of concern on areas examined.    Impression/Plan:    1. Sun damaged skin with solar lentigines  - Recommend sunscreens SPF #30 or greater, protective clothing and avoidance of tanning beds.  - Discussed good sun protective practices for patient's occupation    2. Benign findings including: cherry angioma, dermatofibroma, skin tags  - No further intervention required. Patient to report changes.   - Patient reassured of the benign nature of these lesions.    3. Multiple clinically benign nevi  - No further intervention required. Patient to report changes.   - Patient reassured of the benign nature of these lesions.    CC Maxwell Weaver MD on close of this encounter.  Follow-up in 2-3 years  for Skin Check, earlier for new or changing lesions.     Staff  Involved:  Scribe/Staff    Scribe Disclosure  Skye Mares LPN    Provider Disclosure:   The documentation recorded by the scribe accurately reflects the services I personally performed and the decisions made by me.    Lulú Lechuga MD    Department of Dermatology  [facility]: Phone: [phone], Fax:[phone]  [facility]: Phone: [phone], Fax: [phone]        Again, thank you for allowing me to participate in the care of your patient.        Sincerely,        Lulú Lechuga MD

## 2020-01-23 ENCOUNTER — OFFICE VISIT (OUTPATIENT)
Dept: NEUROLOGY | Facility: CLINIC | Age: 45
End: 2020-01-23
Payer: COMMERCIAL

## 2020-01-23 VITALS
HEART RATE: 68 BPM | BODY MASS INDEX: 27.91 KG/M2 | DIASTOLIC BLOOD PRESSURE: 67 MMHG | OXYGEN SATURATION: 96 % | SYSTOLIC BLOOD PRESSURE: 131 MMHG | WEIGHT: 189 LBS

## 2020-01-23 DIAGNOSIS — R20.9 SENSORY DISTURBANCE: Primary | ICD-10-CM

## 2020-01-23 PROCEDURE — 99213 OFFICE O/P EST LOW 20 MIN: CPT | Performed by: PSYCHIATRY & NEUROLOGY

## 2020-01-23 ASSESSMENT — PAIN SCALES - GENERAL: PAINLEVEL: NO PAIN (0)

## 2020-01-23 NOTE — NURSING NOTE
Navin Velasquez's goals for this visit include:   Chief Complaint   Patient presents with     RECHECK     discuss MRI results       He requests these members of his care team be copied on today's visit information: no    PCP: Darshan Carrasquillo    Referring Provider:  Referred Self, MD  No address on file    /67 (BP Location: Left arm, Patient Position: Sitting, Cuff Size: Adult Regular)   Pulse 68   Wt 85.7 kg (189 lb)   SpO2 96%   BMI 27.91 kg/m      Do you need any medication refills at today's visit? No    Kaya Toro LPN

## 2020-01-23 NOTE — PROGRESS NOTES
Visit Date:   01/23/2020 January 23, 2020      Darshan Carrasquillo MD   [facility] [address] [place] [address]      Dear Dr. Carrasquillo:      I saw Navin Velasquez back.  He is a patient I originally saw on 10/30/2019.  At that time he was experiencing a pulsating sensation in his legs and also an abnormal sensation involving the left lower extremity down to the foot.  He was experiencing some difficult to describe symptoms in his trunk.      On examination, I thought he may have had a subtle afferent pupillary defect on the left, and he may have been describing a Lhermitte sign when he flexed his neck.  I was wondering about the possibility of demyelinating disease given his symptoms.      He subsequently had a head, cervical and thoracic MRI scan done.  The brain study was normal.  There were no abnormalities of the spinal cord.  Blood work included an essentially normal CBC, comprehensive metabolic panel and B12 level of 1,005.      When I called him with the results of the imaging studies, he voiced a concern that his wife had an implanted electrical stimulator causing his symptoms while he was in bed.  I told him I felt this was quite improbable.      Today, he tells me he is sleeping better and he attributes this to cutting out caffeine and exercising.  He still is hypersensitive to things in the environment.  For example, if a train rolls by, he can feel the rumbling in his body, but others cannot.  He reports some tingling in his left leg, but this has diminished.  He also reports a sense of numbness in the left great toe.      He is still concerned about some external source causing his symptoms.  I asked him about anxiety and being stressed and he indicates he is stressed, but he handles it well.      He also reports an intermittent tremor of his left thumb.      PHYSICAL EXAMINATION:   VITAL SIGNS:  His heart rate 68.  Blood pressure 131/67.     GENERAL:  He is alert and  cooperative.  He is calm.  His thoughts are well organized.     CRANIAL NERVES:  He again, has a relative decrease in the pupillary light reflex of the left eye relative to the right.  Otherwise, cranial nerves  II-XII are intact.     MOTOR:  Reveals normal strength.   SENSORY:  He has intact pinprick, position and vibration.  No tremor is appreciated.   TONE:  His muscle tone is normal.  There is no cogwheeling.   GAIT:  Normal.   REFLEXES:  2+.  Plantar responses are flexor.      IMPRESSION:   1.  Sensory disturbance.   2.  Report of intermittent tremor of the left thumb.   3.  Relatively unremarkable neurologic examination.      PLAN:  I reassured him that his neurologic examination again is relatively unremarkable.  I also reassured him about the imaging studies that we did.      It is possible the left lower extremity sensory symptoms he experiences periodically could be radicular in nature.  He does give a history of herniated disk in his back.  I suggested we could explore this further with an EMG examination, but he tells me the left leg symptoms have lessened, he is really not having any pain and he does not have any weakness.      We discussed his sense that there is some external source producing his symptoms.  I told him I felt this was very unlikely.  We discussed how stress may be a factor.  I also discussed the possibility that this could be a delusion.  I offered him a referral to Psychiatry, but he declined.      I do not appreciate a tremor today, but if it does become more problematic or he develops any other concerning neurologic symptoms, he can follow up with me.      Sincerely,         GELA HUFF MD             D: 2020   T: 2020   MT: MANINDER      Name:     LALITO PAULINO   MRN:      [id number]        Account:      [id number]   :      1975           Visit Date:   2020      Document: [id number]       cc: Darshan Carrasquillo MD

## 2020-01-23 NOTE — LETTER
1/23/2020         RE: Navin Velasquez  [address] [place] [address]        Dear Colleague,    Thank you for referring your patient, Navin Velasquez, to the [facility]. Please see a copy of my visit note below.    Visit Date:   01/23/2020 January 23, 2020      Darshan Carrasquillo MD   [facility] [address] [place] [address]      Dear Dr. Carrasquillo:      I saw Navin Velasquez back.  He is a patient I originally saw on 10/30/2019.  At that time he was experiencing a pulsating sensation in his legs and also an abnormal sensation involving the left lower extremity down to the foot.  He was experiencing some difficult to describe symptoms in his trunk.      On examination, I thought he may have had a subtle afferent pupillary defect on the left, and he may have been describing a Lhermitte sign when he flexed his neck.  I was wondering about the possibility of demyelinating disease given his symptoms.      He subsequently had a head, cervical and thoracic MRI scan done.  The brain study was normal.  There were no abnormalities of the spinal cord.  Blood work included an essentially normal CBC, comprehensive metabolic panel and B12 level of 1,005.      When I called him with the results of the imaging studies, he voiced a concern that his wife had an implanted electrical stimulator causing his symptoms while he was in bed.  I told him I felt this was quite improbable.      Today, he tells me he is sleeping better and he attributes this to cutting out caffeine and exercising.  He still is hypersensitive to things in the environment.  For example, if a train rolls by, he can feel the rumbling in his body, but others cannot.  He reports some tingling in his left leg, but this has diminished.  He also reports a sense of numbness in the left great toe.      He is still concerned about some external source causing his symptoms.  I asked him about anxiety  "Outpatient Subjective & Objective     Chief complaint-Preoperative evaluation, Perioperative Medical management, complication reduction plan     Active cardiac conditions- none    Revised cardiac risk index predictors- none    Functional capacity -Examples of physical activity, does stretching, can ride a stationary bike for 25 minutes, does vacuuming, walks on the bleachers,    house work and can take 1 flight of stairs----- She can undertake all the above activities without  chest pain,chest tightness, Shortness of breath ,dizziness,lightheadedness making her exercise tolerance more,   than 4 Mets.       Review of Systems   Constitutional: Negative for chills and fever.            Weight gain from reduced activity   HENT:        Sleep apnea   Eyes:        No sudden visual changes   Respiratory:        No cough , phlegm    No Hemoptysis   Cardiovascular:        As noted   Gastrointestinal:        No overt GI/ blood losses  Bowel movements- once in 2-3 days- long standing     Endocrine:        Prednisone use > 20 mg daily for 3 weeks- none    Genitourinary: Negative for dysuria.        No urinary hesitancy    Musculoskeletal:        As above      Skin: Negative for rash.   Neurological: Negative for syncope.        No unilateral weakness   Hematological:        Current use of Anticoagulants  None    Psychiatric/Behavioral:        No Depression,Anxiety       No vascular stenting           No anesthesia, bleeding, cardiac problems with previous surgeries/procedures.  Medications and Allergies reviewed in epic.   FH- No anesthesia,bleeding  , early onset heart disease in family     Physical Exam  Blood pressure 130/82, pulse 80, height 5' 3" (1.6 m), weight 86.4 kg (190 lb 6.4 oz), SpO2 97 %.      Physical Exam  Constitutional- Vitals - Body mass index is 33.73 kg/m².,   Vitals:    12/02/19 1407   BP: 130/82   Pulse: 80     General appearance-Conscious,Coherent  Eyes- No conjunctival icterus,pupils  round , reactive " and being stressed and he indicates he is stressed, but he handles it well.      He also reports an intermittent tremor of his left thumb.      PHYSICAL EXAMINATION:   VITAL SIGNS:  His heart rate 68.  Blood pressure 131/67.     GENERAL:  He is alert and cooperative.  He is calm.  His thoughts are well organized.     CRANIAL NERVES:  He again, has a relative decrease in the pupillary light reflex of the left eye relative to the right.  Otherwise, cranial nerves  II-XII are intact.     MOTOR:  Reveals normal strength.   SENSORY:  He has intact pinprick, position and vibration.  No tremor is appreciated.   TONE:  His muscle tone is normal.  There is no cogwheeling.   GAIT:  Normal.   REFLEXES:  2+.  Plantar responses are flexor.      IMPRESSION:   1.  Sensory disturbance.   2.  Report of intermittent tremor of the left thumb.   3.  Relatively unremarkable neurologic examination.      PLAN:  I reassured him that his neurologic examination again is relatively unremarkable.  I also reassured him about the imaging studies that we did.      It is possible the left lower extremity sensory symptoms he experiences periodically could be radicular in nature.  He does give a history of herniated disk in his back.  I suggested we could explore this further with an EMG examination, but he tells me the left leg symptoms have lessened, he is really not having any pain and he does not have any weakness.      We discussed his sense that there is some external source producing his symptoms.  I told him I felt this was very unlikely.  We discussed how stress may be a factor.  I also discussed the possibility that this could be a delusion.  I offered him a referral to Psychiatry, but he declined.      I do not appreciate a tremor today, but if it does become more problematic or he develops any other concerning neurologic symptoms, he can follow up with me.      Sincerely,         GELA HUFF MD             D: 01/23/2020   T:  to light  and  bilateral intra ocular lenses  ENT-Oral cavity- moist  and lower partial denture  , Hearing grossly normal   Neck- No thyromegaly ,Trachea -central, No jugular venous distension,   No Carotid Bruit   Cardiovascular -Heart Sounds- Normal  and  no murmur   , No gallop rhythm   Respiratory - Normal Respiratory Effort, Normal breath sounds, crepitations bases,  no wheeze  and  no forced expiratory wheeze    Peripheral pitting pedal edema-- none , no calf pain   Gastrointestinal -Soft abdomen, No palpable masses, Non Tender,Liver,Spleen not palpable. No-- free fluid and shifting dullness  Musculoskeletal- No finger Clubbing. Strength grossly normal   Lymphatic-No Palpable cervical, axillary,Inguinal lymphadenopathy   Psychiatric - normal effect,Orientation  Rt Dorsalis pedis pulses-palpable    Lt Dorsalis pedis pulses- palpable   Rt Posterior tibial pulses -palpable   Left posterior tibial pulses -palpable   Miscellaneous -  Surgical scarposterior neck ,  no renal bruit and osteoarthritic nodes   Investigations  Lab and Imaging have been reviewed in epic.    Review of Medicine tests    EKG-pending     Review of clinical lab tests:  Lab Results   Component Value Date    CREATININE 0.9 11/22/2019    HGB 13.4 11/22/2019     11/22/2019           Review of old records- Was done and information gathered regards to events leading to surgery and health conditions of significance in the perioperative period.    Outpatient Subjective & Objective    2020   MT: MANINDER      Name:     LALITO PAULINO   MRN:      [id number]        Account:      [id number]   :      1975           Visit Date:   2020      Document: [id number]       cc: Darshan Carrasquillo MD       Again, thank you for allowing me to participate in the care of your patient.        Sincerely,        Gianfranco Loera MD

## 2020-11-22 ENCOUNTER — HEALTH MAINTENANCE LETTER (OUTPATIENT)
Age: 45
End: 2020-11-22

## 2021-09-19 ENCOUNTER — HEALTH MAINTENANCE LETTER (OUTPATIENT)
Age: 46
End: 2021-09-19

## 2022-01-09 ENCOUNTER — HEALTH MAINTENANCE LETTER (OUTPATIENT)
Age: 47
End: 2022-01-09

## 2022-11-20 ENCOUNTER — HEALTH MAINTENANCE LETTER (OUTPATIENT)
Age: 47
End: 2022-11-20

## 2023-04-15 ENCOUNTER — HEALTH MAINTENANCE LETTER (OUTPATIENT)
Age: 48
End: 2023-04-15

## 2023-05-26 ENCOUNTER — OFFICE VISIT (OUTPATIENT)
Dept: FAMILY MEDICINE | Facility: CLINIC | Age: 48
End: 2023-05-26
Payer: COMMERCIAL

## 2023-05-26 VITALS
HEART RATE: 62 BPM | OXYGEN SATURATION: 98 % | TEMPERATURE: 98.4 F | DIASTOLIC BLOOD PRESSURE: 83 MMHG | WEIGHT: 187 LBS | HEIGHT: 69 IN | RESPIRATION RATE: 18 BRPM | SYSTOLIC BLOOD PRESSURE: 128 MMHG | BODY MASS INDEX: 27.7 KG/M2

## 2023-05-26 DIAGNOSIS — N50.89 SCROTAL SWELLING: Primary | ICD-10-CM

## 2023-05-26 PROCEDURE — 99203 OFFICE O/P NEW LOW 30 MIN: CPT | Performed by: FAMILY MEDICINE

## 2023-05-26 RX ORDER — CETIRIZINE HYDROCHLORIDE 10 MG/1
10 TABLET ORAL DAILY
COMMUNITY

## 2023-05-26 ASSESSMENT — PAIN SCALES - GENERAL: PAINLEVEL: NO PAIN (0)

## 2023-05-26 NOTE — PATIENT INSTRUCTIONS
- Set up US scrotum     - Follow up with lymphedema therapy     - reach out to your kidney surgeon to let them know about the swelling     - Use scrotal support     - Avoid heavy lifting and pushing , and prolonged setting

## 2023-05-26 NOTE — PROGRESS NOTES
"  Assessment & Plan     Scrotal swelling  Navin Velasquez is a 48 year old male who presents today for concern of left side scrotal swelling started 2 weeks ago.  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  He developed bilateral swelling after the surgery which resolved.  Patient reports swelling started to go down yesterday.  He denies any recent injury or trauma.  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  He denies any current pain, however the testicle was tender for 1 day.  Exam showed left-sided scleral swelling likely secondary to fluid collection -I suspect hydrocele versus scrotal lymphedema   I had a  discussion with the patient about his condition , diagnosis treatment options. We discussed diffenetial diagnosis, and expected course.   I recommend the following :    Patient Instructions   - Set up US scrotum     - Follow up with lymphedema therapy     - reach out to your kidney surgeon to let them know about the swelling     - Use scrotal support     - Avoid heavy lifting and pushing , and prolonged setting     - US Testicular & Scrotum w Doppler Ltd; Future  - Lymphedema Therapy Referral; Future             BMI:   Estimated body mass index is 27.62 kg/m  as calculated from the following:    Height as of this encounter: 1.753 m (5' 9\").    Weight as of this encounter: 84.8 kg (187 lb).           Carl Woodruff MD  [facility]   Navin is a 48 year old, presenting for the following health issues:  Office Visit    Pt said he does have allergies to medications but is not sure of which ones but knows he has them.   He donated a kidney last year and the pharmacist out in all of his allergies, but  We do not have any record of that.            View : No data to display.              History of Present Illness       Reason for visit:  Swollen testicle  Symptom onset:  1-2 weeks ago  Symptoms include:  Swollen " "testicle and scrotum in one side  Symptom intensity:  Mild  Symptom progression:  Improving  Had these symptoms before:  No    He eats 4 or more servings of fruits and vegetables daily.He consumes 0 sweetened beverage(s) daily.He exercises with enough effort to increase his heart rate 60 or more minutes per day.  He exercises with enough effort to increase his heart rate 7 days per week.   He is taking medications regularly.  Navin Velasquez is a 48 year old male who presents today for concern of left side scrotal swelling started 2 weeks ago.  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  He developed bilateral swelling after the surgery which resolved.  Patient reports swelling started to go down yesterday.  He denies any recent injury or trauma.  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  He denies any current pain, however the testicle was tender for 1 day.  Exam showed left-sided scleral swelling likely secondary to fluid collection -I suspect hydrocele versus scrotal lymphedema          Review of Systems   Constitutional, HEENT, cardiovascular, pulmonary, gi and gu systems are negative, except as otherwise noted.      Objective    /83   Pulse 62   Temp 98.4  F (36.9  C) (Tympanic)   Resp 18   Ht 1.753 m (5' 9\")   Wt 84.8 kg (187 lb)   SpO2 98%   BMI 27.62 kg/m    Body mass index is 27.62 kg/m .  Physical Exam  Vitals and nursing note reviewed.   Constitutional:       General: He is not in acute distress.     Appearance: Normal appearance. He is not ill-appearing, toxic-appearing or diaphoretic.   Genitourinary:     Testes:         Left: Swelling and testicular hydrocele present. Tenderness not present.       Neurological:      Mental Status: He is alert.                            "

## 2023-05-31 ENCOUNTER — ANCILLARY PROCEDURE (OUTPATIENT)
Dept: ULTRASOUND IMAGING | Facility: CLINIC | Age: 48
End: 2023-05-31
Attending: FAMILY MEDICINE
Payer: COMMERCIAL

## 2023-05-31 DIAGNOSIS — N50.89 SCROTAL SWELLING: ICD-10-CM

## 2023-05-31 PROCEDURE — 93976 VASCULAR STUDY: CPT | Mod: TC | Performed by: RADIOLOGY

## 2023-05-31 PROCEDURE — 76870 US EXAM SCROTUM: CPT | Mod: TC | Performed by: RADIOLOGY

## 2023-06-15 ENCOUNTER — TRANSCRIBE ORDERS (OUTPATIENT)
Dept: OTHER | Age: 48
End: 2023-06-15

## 2023-06-15 DIAGNOSIS — M48.02 CERVICAL SPINAL STENOSIS: ICD-10-CM

## 2023-06-15 DIAGNOSIS — Z52.4 RENAL DONOR: Primary | ICD-10-CM

## 2024-08-31 ENCOUNTER — HEALTH MAINTENANCE LETTER (OUTPATIENT)
Age: 49
End: 2024-08-31

## 2025-01-02 ENCOUNTER — OFFICE VISIT (OUTPATIENT)
Dept: FAMILY MEDICINE | Facility: CLINIC | Age: 50
End: 2025-01-02
Payer: COMMERCIAL

## 2025-01-02 VITALS
OXYGEN SATURATION: 97 % | DIASTOLIC BLOOD PRESSURE: 80 MMHG | HEART RATE: 68 BPM | HEIGHT: 67 IN | WEIGHT: 188 LBS | RESPIRATION RATE: 20 BRPM | BODY MASS INDEX: 29.51 KG/M2 | TEMPERATURE: 98.6 F | SYSTOLIC BLOOD PRESSURE: 118 MMHG

## 2025-01-02 DIAGNOSIS — Z12.5 SCREENING FOR PROSTATE CANCER: ICD-10-CM

## 2025-01-02 DIAGNOSIS — Z00.00 ROUTINE GENERAL MEDICAL EXAMINATION AT A HEALTH CARE FACILITY: Primary | ICD-10-CM

## 2025-01-02 DIAGNOSIS — Z12.11 SCREEN FOR COLON CANCER: ICD-10-CM

## 2025-01-02 DIAGNOSIS — Z11.59 NEED FOR HEPATITIS C SCREENING TEST: ICD-10-CM

## 2025-01-02 DIAGNOSIS — R06.83 SNORING: ICD-10-CM

## 2025-01-02 LAB
ALBUMIN SERPL BCG-MCNC: 4.9 G/DL (ref 3.5–5.2)
ALP SERPL-CCNC: 65 U/L (ref 40–150)
ALT SERPL W P-5'-P-CCNC: 41 U/L (ref 0–70)
ANION GAP SERPL CALCULATED.3IONS-SCNC: 12 MMOL/L (ref 7–15)
AST SERPL W P-5'-P-CCNC: 36 U/L (ref 0–45)
BASOPHILS # BLD AUTO: 0 10E3/UL (ref 0–0.2)
BASOPHILS NFR BLD AUTO: 0 %
BILIRUB SERPL-MCNC: 1.7 MG/DL
BUN SERPL-MCNC: 20.4 MG/DL (ref 6–20)
CALCIUM SERPL-MCNC: 9.7 MG/DL (ref 8.8–10.4)
CHLORIDE SERPL-SCNC: 105 MMOL/L (ref 98–107)
CHOLEST SERPL-MCNC: 211 MG/DL
CREAT SERPL-MCNC: 1.38 MG/DL (ref 0.67–1.17)
EGFRCR SERPLBLD CKD-EPI 2021: 63 ML/MIN/1.73M2
EOSINOPHIL # BLD AUTO: 0.1 10E3/UL (ref 0–0.7)
EOSINOPHIL NFR BLD AUTO: 2 %
ERYTHROCYTE [DISTWIDTH] IN BLOOD BY AUTOMATED COUNT: 12.7 % (ref 10–15)
EST. AVERAGE GLUCOSE BLD GHB EST-MCNC: 94 MG/DL
FASTING STATUS PATIENT QL REPORTED: YES
FASTING STATUS PATIENT QL REPORTED: YES
GLUCOSE SERPL-MCNC: 91 MG/DL (ref 70–99)
HBA1C MFR BLD: 4.9 % (ref 0–5.6)
HCO3 SERPL-SCNC: 25 MMOL/L (ref 22–29)
HCT VFR BLD AUTO: 43.1 % (ref 40–53)
HCV AB SERPL QL IA: NONREACTIVE
HDLC SERPL-MCNC: 47 MG/DL
HGB BLD-MCNC: 15 G/DL (ref 13.3–17.7)
IMM GRANULOCYTES # BLD: 0 10E3/UL
IMM GRANULOCYTES NFR BLD: 0 %
LDLC SERPL CALC-MCNC: 143 MG/DL
LYMPHOCYTES # BLD AUTO: 1.8 10E3/UL (ref 0.8–5.3)
LYMPHOCYTES NFR BLD AUTO: 36 %
MCH RBC QN AUTO: 29.1 PG (ref 26.5–33)
MCHC RBC AUTO-ENTMCNC: 34.8 G/DL (ref 31.5–36.5)
MCV RBC AUTO: 84 FL (ref 78–100)
MONOCYTES # BLD AUTO: 0.5 10E3/UL (ref 0–1.3)
MONOCYTES NFR BLD AUTO: 9 %
NEUTROPHILS # BLD AUTO: 2.7 10E3/UL (ref 1.6–8.3)
NEUTROPHILS NFR BLD AUTO: 53 %
NONHDLC SERPL-MCNC: 164 MG/DL
PLATELET # BLD AUTO: 124 10E3/UL (ref 150–450)
POTASSIUM SERPL-SCNC: 4.3 MMOL/L (ref 3.4–5.3)
PROT SERPL-MCNC: 7.4 G/DL (ref 6.4–8.3)
PSA SERPL DL<=0.01 NG/ML-MCNC: 1.16 NG/ML (ref 0–2.5)
RBC # BLD AUTO: 5.15 10E6/UL (ref 4.4–5.9)
SODIUM SERPL-SCNC: 142 MMOL/L (ref 135–145)
TRIGL SERPL-MCNC: 107 MG/DL
WBC # BLD AUTO: 5.1 10E3/UL (ref 4–11)

## 2025-01-02 ASSESSMENT — PAIN SCALES - GENERAL: PAINLEVEL_OUTOF10: NO PAIN (0)

## 2025-01-02 NOTE — PATIENT INSTRUCTIONS
Patient Education   Preventive Care Advice   This is general advice given by our system to help you stay healthy. However, your care team may have specific advice just for you. Please talk to your care team about your preventive care needs.  Nutrition  Eat 5 or more servings of fruits and vegetables each day.  Try wheat bread, brown rice and whole grain pasta (instead of white bread, rice, and pasta).  Get enough calcium and vitamin D. Check the label on foods and aim for 100% of the RDA (recommended daily allowance).  Lifestyle  Exercise at least 150 minutes each week  (30 minutes a day, 5 days a week).  Do muscle strengthening activities 2 days a week. These help control your weight and prevent disease.  No smoking.  Wear sunscreen to prevent skin cancer.  Have a dental exam and cleaning every 6 months.  Yearly exams  See your health care team every year to talk about:  Any changes in your health.  Any medicines your care team has prescribed.  Preventive care, family planning, and ways to prevent chronic diseases.  Shots (vaccines)   HPV shots (up to age 26), if you've never had them before.  Hepatitis B shots (up to age 59), if you've never had them before.  COVID-19 shot: Get this shot when it's due.  Flu shot: Get a flu shot every year.  Tetanus shot: Get a tetanus shot every 10 years.  Pneumococcal, hepatitis A, and RSV shots: Ask your care team if you need these based on your risk.  Shingles shot (for age 50 and up)  General health tests  Diabetes screening:  Starting at age 35, Get screened for diabetes at least every 3 years.  If you are younger than age 35, ask your care team if you should be screened for diabetes.  Cholesterol test: At age 39, start having a cholesterol test every 5 years, or more often if advised.  Bone density scan (DEXA): At age 50, ask your care team if you should have this scan for osteoporosis (brittle bones).  Hepatitis C: Get tested at least once in your life.  STIs (sexually  transmitted infections)  Before age 24: Ask your care team if you should be screened for STIs.  After age 24: Get screened for STIs if you're at risk. You are at risk for STIs (including HIV) if:  You are sexually active with more than one person.  You don't use condoms every time.  You or a partner was diagnosed with a sexually transmitted infection.  If you are at risk for HIV, ask about PrEP medicine to prevent HIV.  Get tested for HIV at least once in your life, whether you are at risk for HIV or not.  Cancer screening tests  Cervical cancer screening: If you have a cervix, begin getting regular cervical cancer screening tests starting at age 21.  Breast cancer scan (mammogram): If you've ever had breasts, begin having regular mammograms starting at age 40. This is a scan to check for breast cancer.  Colon cancer screening: It is important to start screening for colon cancer at age 45.  Have a colonoscopy test every 10 years (or more often if you're at risk) Or, ask your provider about stool tests like a FIT test every year or Cologuard test every 3 years.  To learn more about your testing options, visit:   .  For help making a decision, visit:   https://bit.ly/jd10107.  Prostate cancer screening test: If you have a prostate, ask your care team if a prostate cancer screening test (PSA) at age 55 is right for you.  Lung cancer screening: If you are a current or former smoker ages 50 to 80, ask your care team if ongoing lung cancer screenings are right for you.  For informational purposes only. Not to replace the advice of your health care provider. Copyright   2023 [employer]. All rights reserved. Clinically reviewed by the [facility] Transitions Program. [employer] 123164 - REV 01/24.

## 2025-01-02 NOTE — PROGRESS NOTES
"Preventive Care Visit  [facility]  Carl Woodruff MD, Family Medicine  Jan 2, 2025      Assessment & Plan     Routine general medical examination at a health care facility  Preventive care reviewed and updated.  Encourage adequate hydration.  Continue eating healthy and exercise    - Lipid panel reflex to direct LDL Fasting; Future  - CBC with Platelets & Differential; Future  - Comprehensive metabolic panel; Future  - Hemoglobin A1c; Future  - Lipid panel reflex to direct LDL Fasting  - CBC with Platelets & Differential  - Comprehensive metabolic panel  - Hemoglobin A1c    Snoring  Snoring noted by wife, he also reports bradycardia during sleep with heart rate dropping into 40s.  He is asymptomatic.  Referral to sleep medicine to discuss sleep study  - Adult Sleep Eval & Management  Referral; Future    Screen for colon cancer    - Colonoscopy Screening  Referral; Future    Need for hepatitis C screening test    - Hepatitis C Screen Reflex to HCV RNA Quant and Genotype; Future  - Hepatitis C Screen Reflex to HCV RNA Quant and Genotype    Screening for prostate cancer    - Prostate Specific Antigen Screen; Future  - Prostate Specific Antigen Screen          BMI  Estimated body mass index is 29.16 kg/m  as calculated from the following:    Height as of this encounter: 1.71 m (5' 7.32\").    Weight as of this encounter: 85.3 kg (188 lb).   Weight management plan: Discussed healthy diet and exercise guidelines      Work on weight loss  Regular exercise    Chester Holden is a 49 year old, presenting for the following:  Physical    -Neck pain secondary to spinal stenosis - had PT which helped   S/p nephrectomy   Kidney donor     Preventive -     Immunization History   Administered Date(s) Administered    COVID-19 Bivalent 12+ (Pfizer) 06/13/2023    COVID-19 MONOVALENT 12+ (Pfizer) 04/16/2021, 05/07/2021, 01/08/2022    Influenza Vaccine >6 months,quad, PF 01/03/2022    TD,PF 7+ " "([facility]) 2005    TDAP Vaccine (Adacel) 2013    Td (Adult), Adsorbed 2005, 2008       - Colon CA screen: Colonoscopy, age 45-75 every 10 years or FIT every year or Cologuard every 3 years     - Prostate CA screen: Discussed controversy about screening.   No results found for: \"PSA\"    -lipids screen: ordered     Diabetes screen: ordered             2025     8:00 AM   Additional Questions   Roomed by Marjorie   Accompanied by self          History of Present Illness       Reason for visit:  Physical   He is taking medications regularly.        Health Care Directive  Patient does not have a Health Care Directive: Discussed advance care planning with patient; however, patient declined at this time.       No data to display                   No data to display                   No data to display                      No data to display                     Today's PHQ-2 Score:       2025     7:55 AM   PHQ-2 (  [employer])   Q1: Little interest or pleasure in doing things 0   Q2: Feeling down, depressed or hopeless 0   PHQ-2 Score 0    Q1: Little interest or pleasure in doing things Not at all   Q2: Feeling down, depressed or hopeless Not at all   PHQ-2 Score 0       Patient-reported            No data to display              Social History     Tobacco Use    Smoking status: Former     Current packs/day: 0.00     Average packs/day: 0.1 packs/day for 3.0 years (0.2 ttl pk-yrs)     Types: Cigarettes, Dip, chew, snus or snuff     Start date: 2/10/2009     Quit date: 2/10/2012     Years since quittin.9    Smokeless tobacco: Former   Vaping Use    Vaping status: Never Used   Substance Use Topics    Alcohol use: Never    Drug use: Never       ASCVD Risk   The ASCVD Risk score (Brianne DARBY, et al., 2019) failed to calculate for the following reasons:    Cannot find a previous HDL lab    Cannot find a previous total cholesterol lab         No data to display                 Reviewed and " updated as needed this visit by Provider      Problems               Past Medical History:   Diagnosis Date    Arthritis     Kidney donor     S/p nephrectomy      Past Surgical History:   Procedure Laterality Date    ABDOMEN SURGERY      Donor nephrectomy    ORTHOPEDIC SURGERY  2012    emergency right knee scope     Lab work is in process  BP Readings from Last 3 Encounters:   25 118/80   23 128/83   20 131/67    Wt Readings from Last 3 Encounters:   25 85.3 kg (188 lb)   23 84.8 kg (187 lb)   20 85.7 kg (189 lb)                  Patient Active Problem List   Diagnosis    CARDIOVASCULAR SCREENING; LDL GOAL LESS THAN 160    Knee injury     Past Surgical History:   Procedure Laterality Date    ABDOMEN SURGERY      Donor nephrectomy    ORTHOPEDIC SURGERY  2012    emergency right knee scope       Social History     Tobacco Use    Smoking status: Former     Current packs/day: 0.00     Average packs/day: 0.1 packs/day for 3.0 years (0.2 ttl pk-yrs)     Types: Cigarettes, Dip, chew, snus or snuff     Start date: 2/10/2009     Quit date: 2/10/2012     Years since quittin.9    Smokeless tobacco: Former   Substance Use Topics    Alcohol use: Never     Family History   Problem Relation Age of Onset    Hypertension Mother     Vitiligo Mother     Cardiovascular Maternal Grandfather 34        mi    Cardiovascular Maternal Uncle 34    Hypertension Maternal Uncle     Esophageal Cancer Maternal Uncle     Hypertension Maternal Aunt     Prostate Cancer Paternal Uncle          Current Outpatient Medications   Medication Sig Dispense Refill    acetaminophen (TYLENOL 8 HOUR ARTHRITIS PAIN) 650 MG CR tablet Take 1,300 mg by mouth every 8 hours as needed for mild pain or fever (Patient not taking: Reported on 2025)      cetirizine (ZYRTEC) 10 MG tablet Take 10 mg by mouth daily (Patient not taking: Reported on 2025)       No Known Allergies  Recent Labs   Lab Test  "01/02/25  0831 08/29/19  0756   A1C 4.9  --    LDL  --  93   HDL  --  47   TRIG  --  66   ALT  --  52   CR  --  0.92   GFRESTIMATED  --  >90   GFRESTBLACK  --  >90   POTASSIUM  --  4.2          Review of Systems  Constitutional, HEENT, cardiovascular, pulmonary, gi and gu systems are negative, except as otherwise noted.     Objective    Exam  /80   Pulse 68   Temp 98.6  F (37  C) (Oral)   Resp 20   Ht 1.71 m (5' 7.32\")   Wt 85.3 kg (188 lb)   SpO2 97%   BMI 29.16 kg/m     Estimated body mass index is 29.16 kg/m  as calculated from the following:    Height as of this encounter: 1.71 m (5' 7.32\").    Weight as of this encounter: 85.3 kg (188 lb).    Physical Exam  GENERAL: alert and no distress  NECK: no adenopathy, no asymmetry, masses, or scars  RESP: lungs clear to auscultation - no rales, rhonchi or wheezes  CV: regular rate and rhythm, normal S1 S2, no S3 or S4, no murmur, click or rub, no peripheral edema  ABDOMEN: soft, nontender, no hepatosplenomegaly, no masses and bowel sounds normal  MS: no gross musculoskeletal defects noted, no edema        Signed Electronically by: Carl Woodruff MD    "

## 2025-01-15 ENCOUNTER — HOSPITAL ENCOUNTER (OUTPATIENT)
Facility: AMBULATORY SURGERY CENTER | Age: 50
End: 2025-01-15
Payer: COMMERCIAL

## 2025-01-15 ENCOUNTER — TELEPHONE (OUTPATIENT)
Dept: GASTROENTEROLOGY | Facility: CLINIC | Age: 50
End: 2025-01-15
Payer: COMMERCIAL

## 2025-01-15 DIAGNOSIS — Z12.11 ENCOUNTER FOR SCREENING COLONOSCOPY: Primary | ICD-10-CM

## 2025-01-15 NOTE — TELEPHONE ENCOUNTER
"Endoscopy Scheduling Screen    Have you had any respiratory illness or flu-like symptoms in the last 10 days?  No    What is your communication preference for Instructions and/or Bowel Prep?   MyChart    What insurance is in the chart?  Other:  bcbs     Ordering/Referring Provider:      BARNEY JAQUEZ      (If ordering provider performs procedure, schedule with ordering provider unless otherwise instructed. )    BMI: Estimated body mass index is 29.16 kg/m  as calculated from the following:    Height as of 1/2/25: 1.71 m (5' 7.32\").    Weight as of 1/2/25: 85.3 kg (188 lb).     Sedation Ordered  moderate sedation.   If patient BMI > 50 do not schedule in [facility].    If patient BMI > 45 do not schedule at [facility].    Are you taking methadone or Suboxone?  NO, No RN review required.    Have you been diagnosed and are being treated for severe PTSD or severe anxiety?  NO, No RN review required.    Are you taking any prescription medications for pain 3 or more times per week?   NO, No RN review required.    Do you have a history of malignant hyperthermia?  No    (Females) Are you currently pregnant?   No     Have you been diagnosed or told you have pulmonary hypertension?   No    Do you have an LVAD?  No    Have you been told you have moderate to severe sleep apnea?  No.    Have you been told you have COPD, asthma, or any other lung disease?  No    Do you have any heart conditions?  No     Have you ever had or are you waiting for an organ transplant?  No. Continue scheduling, no site restrictions.    Have you had a stroke or transient ischemic attack (TIA aka \"mini stroke\" in the last 6 months?   No    Have you been diagnosed with or been told you have cirrhosis of the liver?   No.    Are you currently on dialysis?   No    Do you need assistance transferring?   No    BMI: Estimated body mass index is 29.16 kg/m  as calculated from the following:    Height as of 1/2/25: 1.71 m (5' 7.32\").    Weight as of 1/2/25: 85.3 kg (188 " lb).     Is patients BMI > 40 and scheduling location [facility]?  No    Do you take an injectable or oral medication for weight loss or diabetes (excluding insulin)?  No    Do you take the medication Naltrexone?  No    Do you take blood thinners?  No       Prep   Are you currently on dialysis or do you have chronic kidney disease?  No    Do you have a diagnosis of diabetes?  No    Do you have a diagnosis of cystic fibrosis (CF)?  No    On a regular basis do you go 3 -5 days between bowel movements?  No    BMI > 40?  No    Preferred Pharmacy:    [id number] - [place] - [address]  [address]  [place] [address]  Phone: [phone] Fax: [phone]      Final Scheduling Details     Procedure scheduled  Colonoscopy    Surgeon:  Elizabeth      Date of procedure:  01/22/2025     Pre-OP / PAC:   No - Not required for this site.    Location  [facility] - [facility] - Per order.    Sedation   Moderate Sedation - Per order.      Patient Reminders:   You will receive a call from a Nurse to review instructions and health history.  This assessment must be completed prior to your procedure.  Failure to complete the Nurse assessment may result in the procedure being cancelled.      On the day of your procedure, please designate an adult(s) who can drive you home stay with you for the next 24 hours. The medicines used in the exam will make you sleepy. You will not be able to drive.      You cannot take public transportation, ride share services, or non-medical taxi service without a responsible caregiver.  Medical transport services are allowed with the requirement that a responsible caregiver will receive you at your destination.  We require that drivers and caregivers are confirmed prior to your procedure.

## 2025-01-20 ENCOUNTER — TELEPHONE (OUTPATIENT)
Dept: GASTROENTEROLOGY | Facility: CLINIC | Age: 50
End: 2025-01-20
Payer: COMMERCIAL

## 2025-01-21 ENCOUNTER — TELEPHONE (OUTPATIENT)
Dept: GASTROENTEROLOGY | Facility: CLINIC | Age: 50
End: 2025-01-21
Payer: COMMERCIAL

## 2025-01-21 RX ORDER — ONDANSETRON 2 MG/ML
4 INJECTION INTRAMUSCULAR; INTRAVENOUS
Status: CANCELLED | OUTPATIENT
Start: 2025-01-21

## 2025-01-21 RX ORDER — ONDANSETRON 2 MG/ML
4 INJECTION INTRAMUSCULAR; INTRAVENOUS EVERY 6 HOURS PRN
Status: CANCELLED | OUTPATIENT
Start: 2025-01-21

## 2025-01-21 RX ORDER — NALOXONE HYDROCHLORIDE 0.4 MG/ML
0.4 INJECTION, SOLUTION INTRAMUSCULAR; INTRAVENOUS; SUBCUTANEOUS
Status: CANCELLED | OUTPATIENT
Start: 2025-01-21

## 2025-01-21 RX ORDER — NALOXONE HYDROCHLORIDE 0.4 MG/ML
0.2 INJECTION, SOLUTION INTRAMUSCULAR; INTRAVENOUS; SUBCUTANEOUS
Status: CANCELLED | OUTPATIENT
Start: 2025-01-21

## 2025-01-21 RX ORDER — FLUMAZENIL 0.1 MG/ML
0.2 INJECTION, SOLUTION INTRAVENOUS
Status: CANCELLED | OUTPATIENT
Start: 2025-01-21 | End: 2025-01-21

## 2025-01-21 RX ORDER — ONDANSETRON 4 MG/1
4 TABLET, ORALLY DISINTEGRATING ORAL EVERY 6 HOURS PRN
Status: CANCELLED | OUTPATIENT
Start: 2025-01-21

## 2025-01-21 RX ORDER — PROCHLORPERAZINE MALEATE 10 MG
10 TABLET ORAL EVERY 6 HOURS PRN
Status: CANCELLED | OUTPATIENT
Start: 2025-01-21

## 2025-01-21 RX ORDER — LIDOCAINE 40 MG/G
CREAM TOPICAL
Status: CANCELLED | OUTPATIENT
Start: 2025-01-21

## 2025-01-21 NOTE — TELEPHONE ENCOUNTER
Caller: Navin Velasquez   Reason for Reschedule/Cancellation (please be detailed, any staff messages or encounters to note?):     Per pt- poor prep     Did you cancel or rescheduled an [facility] procedure? No.    Is screening questionnaire older than 3 months from the reschedule date.   If Yes, please complete screening questionnaire. No    Prior to reschedule please review:  Ordering Provider:    Carl Woodruff MD     Sedation Determined: mod   Does patient have any ASC Exclusions, please identify?: n       Notes on Cancelled Procedure:  Procedure:Lower Endoscopy [Colonoscopy]   Date: 01/22  Location:[facility]; [address], [place] [address]  Surgeon: Lyly         Rescheduled:  NO  - pt will call back to r/s